# Patient Record
Sex: MALE | Race: WHITE | Employment: FULL TIME | URBAN - METROPOLITAN AREA
[De-identification: names, ages, dates, MRNs, and addresses within clinical notes are randomized per-mention and may not be internally consistent; named-entity substitution may affect disease eponyms.]

---

## 2024-11-01 ENCOUNTER — HOSPITAL ENCOUNTER (OUTPATIENT)
Dept: RADIOLOGY | Facility: CLINIC | Age: 24
Discharge: HOME | End: 2024-11-01
Payer: MEDICARE

## 2024-11-01 ENCOUNTER — OFFICE VISIT (OUTPATIENT)
Dept: ORTHOPEDIC SURGERY | Facility: CLINIC | Age: 24
End: 2024-11-01
Payer: MEDICARE

## 2024-11-01 VITALS — HEIGHT: 69 IN | BODY MASS INDEX: 31.1 KG/M2 | WEIGHT: 210 LBS

## 2024-11-01 DIAGNOSIS — M25.511 ACUTE PAIN OF RIGHT SHOULDER: ICD-10-CM

## 2024-11-01 DIAGNOSIS — S43.51XA ACROMIOCLAVICULAR SPRAIN, RIGHT, INITIAL ENCOUNTER: Primary | ICD-10-CM

## 2024-11-01 DIAGNOSIS — M25.512 ACUTE PAIN OF LEFT SHOULDER: ICD-10-CM

## 2024-11-01 PROCEDURE — 73030 X-RAY EXAM OF SHOULDER: CPT | Mod: RT

## 2024-11-18 ENCOUNTER — APPOINTMENT (OUTPATIENT)
Dept: ORTHOPEDIC SURGERY | Facility: CLINIC | Age: 24
End: 2024-11-18
Payer: MEDICARE

## 2024-11-18 ENCOUNTER — HOSPITAL ENCOUNTER (OUTPATIENT)
Dept: RADIOLOGY | Facility: CLINIC | Age: 24
End: 2024-11-18
Payer: MEDICARE

## 2024-11-19 ENCOUNTER — HOSPITAL ENCOUNTER (OUTPATIENT)
Dept: RADIOLOGY | Facility: CLINIC | Age: 24
Discharge: HOME | End: 2024-11-19
Payer: MEDICARE

## 2024-11-19 ENCOUNTER — OFFICE VISIT (OUTPATIENT)
Dept: ORTHOPEDIC SURGERY | Facility: CLINIC | Age: 24
End: 2024-11-19
Payer: MEDICARE

## 2024-11-19 DIAGNOSIS — M25.512 CHRONIC LEFT SHOULDER PAIN: ICD-10-CM

## 2024-11-19 DIAGNOSIS — M25.512 ACUTE PAIN OF LEFT SHOULDER: ICD-10-CM

## 2024-11-19 DIAGNOSIS — G89.29 CHRONIC LEFT SHOULDER PAIN: ICD-10-CM

## 2024-11-19 DIAGNOSIS — S43.51XD ACROMIOCLAVICULAR SPRAIN, RIGHT, SUBSEQUENT ENCOUNTER: Primary | ICD-10-CM

## 2024-11-19 PROCEDURE — 73030 X-RAY EXAM OF SHOULDER: CPT | Mod: LT

## 2024-11-19 PROCEDURE — 99214 OFFICE O/P EST MOD 30 MIN: CPT | Performed by: FAMILY MEDICINE

## 2024-11-19 PROCEDURE — 73030 X-RAY EXAM OF SHOULDER: CPT | Mod: LEFT SIDE | Performed by: RADIOLOGY

## 2024-11-19 NOTE — PROGRESS NOTES
History of Present Illness   Chief Complaint   Patient presents with    Left Shoulder - Follow-up    Right Shoulder - Follow-up       The patient is 24 y.o. right-hand-dominant male  here for follow-up of right shoulder AC sprain as well as to discuss some ongoing left shoulder pain.  With regards to right shoulder patient had acute onset of symptoms a little over 2 weeks ago after a fall landing on his right shoulder, x-rays unremarkable, symptoms consistent with grade 1 AC sprain, recommended conservative management with brief sling immobilization for comfort, he was given some home exercises to begin, recommended follow-up today.  He says that he has seen some improvement in symptoms, still having pain but range of motion is improving, patient works in construction, he has been doing majority of work with his left upper extremity but still requires his right arm for some things which he feels like maybe delaying his recovery.  Location of pain remains focal to the AC joint.  No new injuries or symptoms on the right    With regards to the left shoulder patient sustained a small avulsion fracture of the distal clavicle with associated AC sprain 2022, treatment was conservative at that time.  He says that he has had some lingering discomfort in his left shoulder, pain in the left shoulder feels more deep inside, he has some pain with lifting things, overhead activity, struggling to work out like he would want to in the gym because of shoulder pain on the left.  He denies any popping or clicking or instability.      No past medical history on file.    Medication Documentation Review Audit       Reviewed by Gilson Curran MD (Physician) on 11/01/24 at 1112      Medication Order Taking? Sig Documenting Provider Last Dose Status            No Medications to Display                                   No Known Allergies    Social History     Socioeconomic History    Marital status: Single     Spouse name: Not on file     Number of children: Not on file    Years of education: Not on file    Highest education level: Not on file   Occupational History    Not on file   Tobacco Use    Smoking status: Every Day     Types: Cigarettes    Smokeless tobacco: Not on file   Substance and Sexual Activity    Alcohol use: Never    Drug use: Never    Sexual activity: Not on file   Other Topics Concern    Not on file   Social History Narrative    Not on file     Social Drivers of Health     Financial Resource Strain: Not on file   Food Insecurity: Not on file   Transportation Needs: Not on file   Physical Activity: Not on file   Stress: Not on file   Social Connections: Not on file   Intimate Partner Violence: Not on file   Housing Stability: Not on file       No past surgical history on file.       Review of Systems   GENERAL: Negative  GI: Negative  MUSCULOSKELETAL: See HPI  SKIN: Negative  NEURO:  Negative     Physical Exam:    General/Constitutional: well appearing, no distress, appears stated age  HEENT: sclera clear  Respiratory: non labored breathing  Vascular: No edema, swelling or tenderness, except as noted in detailed exam.  Integumentary: No impressive skin lesions present, except as noted in detailed exam.  Neurological:  Alert and oriented   Psychological:  Normal mood and affect.  Musculoskeletal: Normal, except as noted in detailed exam and in HPI    Right shoulder: Normal appearance, no swelling, no ecchymosis.  There is residual tenderness at the AC joint, there is no instability, there is no prominence.  Range of motion is improved forward flexion and abduction both around 160 degrees improved from 70 and 110 respectively.  He has no significant weakness with rotator cuff strength testing today, there is some mild discomfort with resisted abduction.  There is some residual pain with [place] testing at the AC joint.    Left shoulder: Normal appearance, no skin changes, no muscle atrophy.  He is nontender at the acromioclavicular  joint, there is some mild tenderness at the subacromial space.  He has good range of motion, forward flexion and abduction 170 degrees, internal rotation lower thoracic spine.  No significant weakness with rotator cuff strength testing, some mild pain with resisted abduction.  He has mild discomfort with [facility] testing.       Imaging: X-rays of left shoulder obtained today and independently reviewed, there is evidence of healed minimally displaced avulsion fracture of the superior aspect of the distal clavicle with good bridging callus formation compared to prior x-rays from 2022, no other abnormalities are seen.      Assessment   1. Acromioclavicular sprain, right, subsequent encounter  Referral to Physical Therapy      2. Chronic left shoulder pain  Referral to Physical Therapy              Plan: With regards to right shoulder grade 1 AC sprain, seems to be healing overall, he is still using his shoulder quite a bit for work, anticipate continued gradual improvement in symptoms with conservative management.  I did place referral to physical therapy more for his left shoulder but will include his right shoulder diagnosis to assist in recovery.  With regards to his left shoulder symptoms thought to be secondary to likely some impingement, no signs of any residual pain at the distal clavicle or AC joint where he had prior injury.  Referral to physical therapy was placed to work on some rotator cuff strengthening, scapular stabilization exercises.  Plan for follow-up in 2 months.

## 2025-01-20 ENCOUNTER — APPOINTMENT (OUTPATIENT)
Dept: ORTHOPEDIC SURGERY | Facility: CLINIC | Age: 25
End: 2025-01-20
Payer: MEDICARE

## 2025-04-04 ENCOUNTER — APPOINTMENT (OUTPATIENT)
Dept: PRIMARY CARE | Facility: CLINIC | Age: 25
End: 2025-04-04
Payer: MEDICARE

## 2025-04-07 ENCOUNTER — APPOINTMENT (OUTPATIENT)
Dept: PRIMARY CARE | Facility: CLINIC | Age: 25
End: 2025-04-07
Payer: MEDICARE

## 2025-05-06 ENCOUNTER — OFFICE VISIT (OUTPATIENT)
Dept: URGENT CARE | Age: 25
End: 2025-05-06
Payer: MEDICARE

## 2025-05-06 VITALS
TEMPERATURE: 98.4 F | SYSTOLIC BLOOD PRESSURE: 121 MMHG | BODY MASS INDEX: 29.35 KG/M2 | DIASTOLIC BLOOD PRESSURE: 75 MMHG | RESPIRATION RATE: 18 BRPM | HEART RATE: 70 BPM | OXYGEN SATURATION: 97 % | HEIGHT: 70 IN | WEIGHT: 205 LBS

## 2025-05-06 DIAGNOSIS — K08.89 PAIN, DENTAL: ICD-10-CM

## 2025-05-06 DIAGNOSIS — J02.9 SORE THROAT: ICD-10-CM

## 2025-05-06 DIAGNOSIS — B34.8 RHINOVIRUS: Primary | ICD-10-CM

## 2025-05-06 LAB
POC HUMAN RHINOVIRUS PCR: POSITIVE
POC INFLUENZA A VIRUS PCR: NEGATIVE
POC INFLUENZA B VIRUS PCR: NEGATIVE
POC RESPIRATORY SYNCYTIAL VIRUS PCR: NEGATIVE
POC STREPTOCOCCUS PYOGENES (GROUP A STREP) PCR: NEGATIVE

## 2025-05-06 RX ORDER — AMOXICILLIN AND CLAVULANATE POTASSIUM 875; 125 MG/1; MG/1
1 TABLET, FILM COATED ORAL 2 TIMES DAILY
Qty: 20 TABLET | Refills: 0 | Status: SHIPPED | OUTPATIENT
Start: 2025-05-06 | End: 2025-05-16

## 2025-05-06 ASSESSMENT — ENCOUNTER SYMPTOMS
SORE THROAT: 1
FEVER: 1
TROUBLE SWALLOWING: 0
DIAPHORESIS: 1
VOICE CHANGE: 0
COUGH: 0

## 2025-05-06 ASSESSMENT — PAIN SCALES - GENERAL: PAINLEVEL_OUTOF10: 6

## 2025-05-06 NOTE — PROGRESS NOTES
"Subjective   Patient ID: Bernadette Herring \"Brain" is a 24 y.o. male. They present today with a chief complaint of Sore Throat (And body aches for 3 days).    History of Present Illness    Sore Throat   Pertinent negatives include no congestion, coughing, drooling or trouble swallowing.       Patient presents to urgent care for complaints of sore throat, body aches, headache and dental pain for 3 days.  Patient states he woke up this morning and his pain was worse.  He also states that he believes he had a fever this morning.    Past Medical History  Allergies as of 05/06/2025    (No Known Allergies)       Prescriptions Prior to Admission[1]     Medical History[2]    Surgical History[3]     reports that he has never smoked. He uses smokeless tobacco. He reports current alcohol use. He reports that he does not use drugs.    Review of Systems  Review of Systems   Constitutional:  Positive for diaphoresis and fever.   HENT:  Positive for dental problem and sore throat. Negative for congestion, drooling, trouble swallowing and voice change.    Respiratory:  Negative for cough.                                   Objective    Vitals:    05/06/25 1228   BP: 121/75   Pulse: 70   Resp: 18   Temp: 36.9 °C (98.4 °F)   TempSrc: Oral   SpO2: 97%   Weight: 93 kg (205 lb)   Height: 1.778 m (5' 10\")     No LMP for male patient.    Physical Exam  Vitals reviewed.   Constitutional:       Appearance: Normal appearance.   HENT:      Head: Normocephalic.      Right Ear: Tympanic membrane, ear canal and external ear normal.      Left Ear: Tympanic membrane, ear canal and external ear normal.      Nose: Nose normal.      Mouth/Throat:      Dentition: Dental tenderness (left upper) present. No dental abscesses.      Pharynx: Posterior oropharyngeal erythema present.     Eyes:      Conjunctiva/sclera: Conjunctivae normal.   Cardiovascular:      Rate and Rhythm: Normal rate and regular rhythm.      Heart sounds: Normal heart " sounds.   Pulmonary:      Effort: Pulmonary effort is normal.      Breath sounds: Normal breath sounds and air entry.   Lymphadenopathy:      Cervical: Cervical adenopathy present.   Skin:     General: Skin is warm and dry.   Neurological:      Mental Status: He is alert.         Procedures    Point of Care Test & Imaging Results from this visit  Results for orders placed or performed in visit on 05/06/25   POCT SPOTFIRE R/ST Panel Mini w/Strep A ([facility]) manually resulted   Result Value Ref Range    POC Group A Strep, PCR Negative Negative    POC Respiratory Syncytial Virus PCR Negative Negative    POC Influenza A Virus PCR Negative Negative    POC Influenza B Virus PCR Negative Negative    POC Human Rhinovirus PCR Positive (A) Negative      Imaging  No results found.    Cardiology, Vascular, and Other Imaging  No other imaging results found for the past 2 days      Diagnostic study results (if any) were reviewed by COLEMAN Sánchez.    Assessment/Plan   Allergies, medications, history, and pertinent labs/EKGs/Imaging reviewed by COLEMAN Sánchez.     Medical Decision Making  Strep spotfire PCR test was positive for rhinovirus. Patient also complaining of dental pain and states he needs a root canal. Will place him on Augmentin for dental infection. Patient was told to follow up with his dentist. You may use tylenol or ibuprofen as needed for pain.     At time of discharge patient was clinically well-appearing and HDS for outpatient management. The patient and/or family was educated regarding diagnosis, supportive care, OTC and Rx medications. The patient and/or family was given the opportunity to ask questions prior to discharge.  They verbalized understanding of my discussion of the plans for treatment, expected course, indications to return to  or seek further evaluation in ED, and the need for timely follow up as directed.   They were provided with a work/school excuse if requested.       Orders and Diagnoses  Diagnoses and all orders for this visit:  Rhinovirus  Sore throat  -     POCT SPOTFIRE R/ST Panel Mini w/Strep A ([facility]) manually resulted  -     amoxicillin-clavulanate (Augmentin) 875-125 mg tablet; Take 1 tablet by mouth 2 times a day for 10 days.  Pain, dental  -     amoxicillin-clavulanate (Augmentin) 875-125 mg tablet; Take 1 tablet by mouth 2 times a day for 10 days.      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    Sleep phase syndrome, delayed    Depression, unspecified 12/07/2020    Depressive disorder    Encounter for removal of sutures 07/28/2017    Encounter for removal of sutures    Encounter for screening for infections with a predominantly sexual mode of transmission 12/02/2020    Screening for STD (sexually transmitted disease)    Encounter for screening for infections with a predominantly sexual mode of transmission 11/12/2019    Screening for STD (sexually transmitted disease)    Follicular cyst of the skin and subcutaneous tissue, unspecified 12/07/2020    Cyst of scrotum    Fracture of unspecified phalanx of left thumb, initial encounter for closed fracture 02/12/2020    Closed displaced fracture of phalanx of left thumb, unspecified phalanx, initial encounter    Hemorrhage, not elsewhere classified 12/02/2020    Blood on toilet paper    Hypermetropia, bilateral 02/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    Hyperopia with presbyopia of both eyes    Hypermetropia, bilateral 03/11/2016    Hypermetropia of both eyes    Hypermetropia, right eye 04/04/2017    Hypermetropia of right eye     Hypoxemia 02/14/2017    Hypoxia    Inadequate sleep hygiene 08/29/2017    History of difficulty sleeping    Insomnia, unspecified 06/20/2018    Insomnia, persistent    Insufficient sleep syndrome 09/19/2017    Insufficient sleep syndrome    Laceration without foreign body of scalp, subsequent encounter 07/28/2017    Laceration of scalp, subsequent encounter    Nausea 08/29/2017    Nausea without vomiting    Other fracture of unspecified lower leg, initial encounter for closed fracture 08/19/2015    Triplane fracture of ankle    Other problems related to education and literacy 09/19/2017    Deterioration in school performance    Personal history of diseases of the skin and subcutaneous tissue 12/07/2020    History of acne    Personal history of other diseases of the respiratory system 07/11/2019    History of sore throat    Personal history of other diseases of the respiratory system 08/29/2017    History of viral pharyngitis    Personal history of other diseases of the respiratory system 01/04/2017    History of allergic rhinitis    Personal history of other mental and behavioral disorders 12/07/2020    History of anxiety disorder    Personal history of other mental and behavioral disorders 12/07/2020    History of attention deficit disorder    Personal history of other specified conditions 12/07/2020    History of caries    Personal history of other specified conditions 02/14/2017    History of chest pain    Personal history of traumatic brain injury 09/07/2017    History of concussion    Restlessness and agitation 08/17/2017    Agitation    Sprain of unspecified ligament of right ankle, initial encounter 10/17/2017    Right ankle sprain    Tobacco use 12/07/2020    Nicotine abuse   [3] No past surgical history on file.